# Patient Record
Sex: FEMALE | Race: WHITE | NOT HISPANIC OR LATINO | Employment: UNEMPLOYED | URBAN - METROPOLITAN AREA
[De-identification: names, ages, dates, MRNs, and addresses within clinical notes are randomized per-mention and may not be internally consistent; named-entity substitution may affect disease eponyms.]

---

## 2017-07-04 ENCOUNTER — HOSPITAL ENCOUNTER (EMERGENCY)
Facility: CLINIC | Age: 57
Discharge: HOME | End: 2017-07-04
Attending: EMERGENCY MEDICINE | Admitting: EMERGENCY MEDICINE
Payer: COMMERCIAL

## 2017-07-04 VITALS
SYSTOLIC BLOOD PRESSURE: 114 MMHG | OXYGEN SATURATION: 96 % | TEMPERATURE: 98.7 F | RESPIRATION RATE: 16 BRPM | HEART RATE: 68 BPM | DIASTOLIC BLOOD PRESSURE: 83 MMHG | WEIGHT: 170 LBS

## 2017-07-04 DIAGNOSIS — F10.20 ALCOHOL DEPENDENCE, CONTINUOUS DRINKING BEHAVIOR (H): ICD-10-CM

## 2017-07-04 DIAGNOSIS — F10.220 ALCOHOL DEPENDENCE WITH UNCOMPLICATED INTOXICATION (H): ICD-10-CM

## 2017-07-04 LAB — ALCOHOL BREATH TEST: 0.18 (ref 0–0.01)

## 2017-07-04 PROCEDURE — 99283 EMERGENCY DEPT VISIT LOW MDM: CPT | Performed by: EMERGENCY MEDICINE

## 2017-07-04 PROCEDURE — 82075 ASSAY OF BREATH ETHANOL: CPT | Performed by: EMERGENCY MEDICINE

## 2017-07-04 PROCEDURE — 99283 EMERGENCY DEPT VISIT LOW MDM: CPT | Mod: Z6 | Performed by: EMERGENCY MEDICINE

## 2017-07-04 RX ORDER — NICOTINE POLACRILEX 4 MG/1
20 GUM, CHEWING ORAL DAILY
COMMUNITY

## 2017-07-04 RX ORDER — CITALOPRAM HYDROBROMIDE 20 MG/1
30 TABLET ORAL DAILY
COMMUNITY
End: 2021-11-29

## 2017-07-04 RX ORDER — METOPROLOL TARTRATE 50 MG
50 TABLET ORAL 2 TIMES DAILY
COMMUNITY
End: 2021-09-30

## 2017-07-04 ASSESSMENT — ENCOUNTER SYMPTOMS
NECK STIFFNESS: 0
SEIZURES: 0
HALLUCINATIONS: 1
SHORTNESS OF BREATH: 0
ARTHRALGIAS: 0
HEADACHES: 0
CONFUSION: 0
EYE REDNESS: 0
ABDOMINAL PAIN: 0
COLOR CHANGE: 0
DIFFICULTY URINATING: 0
FEVER: 0

## 2017-07-04 NOTE — ED AVS SNAPSHOT
[facility], Emergency Department    [address]    [facility] [place] [address]    Phone:  [phone]    Fax:  [phone]                                       Enedina Davenport   MRN: [id number]    Department:  [facility], Emergency Department   Date of Visit:  7/4/2017           Patient Information     Date Of Birth          1960        Your diagnoses for this visit were:     Alcohol dependence, continuous drinking behavior (H)        You were seen by David Hunter MD.        Discharge Instructions         Alcohol Addiction  Are you addicted to alcohol?    You may be addicted to alcohol if your drinking harms yourself or others or leads to other problems with your daily life.  The medical term for this is alcohol use disorder. Your healthcare provider may diagnose you with this disorder if you have at least two of the following problems within the span of a year:    You drink alcohol in larger amounts or for a longer period than you intended.    You frequently want to cut down or control alcohol use, or have frequently failed efforts to do so.    You spend a lot of time getting alcohol, using alcohol, or recovering from alcohol use.    You crave or have a strong desire or urge to drink alcohol.    Ongoing alcohol use makes it hard for you to be responsible at work, school, or home.    You continue to use alcohol even though you have had problems in relationships or social settings because of it.    You give up or miss important social, work, or recreational activities because of alcohol use.    You drink alcohol in situations in which it is physically unsafe, such as drinking then driving while intoxicated.    You continue to drink alcohol despite knowing it has caused physical or emotional problems.    You need more and more alcohol to get the same effects.    You hide how much alcohol you drink from family and friends.    You have withdrawal symptoms or use alcohol to avoid withdrawal symptoms.  Date  "Last Reviewed: 2/1/2017 2000-2017 [employer]. [address] [place] [address], [place] [address]. All rights reserved. This information is not intended as a substitute for professional medical care. Always follow your healthcare professional's instructions.          Alcohol Abuse  Alcoholic drinks are harmful when you have too many of them. There is no set number of drinks that defines too much. Drinking that disrupts your life or your health is called alcohol abuse. Alcohol abuse can hurt your relationships with others. You may lose friends, a spouse, or even your job. You may be abusing alcohol if any of the following are true for you:    Duties at home or with  suffer because of drinking.    Duties at work or in school suffer because of drinking.    You have missed work or school because of drinking.    You use alcohol while driving or operating machinery.    You have legal problems such as arrests due to drinking.    You keep drinking even though it causes serious problems in your life.  Health effects  Alcohol abuse causes health problems. Sometimes this can happen after only drinking a  little.\" There is no set number of drinks or amount of alcohol that defines too much. The more you drink at one time, and the more often you drink determine both the short-term and long-term health effects. It affects all parts of your body and your health, including your:    Brain. Alcohol is a central nervous system depressant. It can damage parts of the brain that affect your balance, memory, thinking, and emotions. It can cause memory loss, blackouts, depression, agitation, sleep cycle changes, and seizures. These changes may or may not be reversible.    Heart and vascular system. Alcohol affects multiple areas. It can damage heart muscle causing cardiomyopathy, which is a weakening and stretching of the heart muscle. This can lead to trouble breathing, an irregular heartbeat, atrial fibrillation, leg " swelling, and heart failure. Alcohol use makes the blood vessels stiffen causing high blood pressure. All of these problems increase your risk of having heart attacks or strokes.    Liver. Alcohol causes fat to build up in the liver, affecting its normal function. This increases the risk for hepatitis, leading to abdominal pain, appetite loss, jaundice, bleeding problems, liver fibrosis, and cirrhosis. This, in turn, can affect your ability to fight off infections, and can cause diabetes. The liver changes prevent it from removing toxins in your blood that can cause encephalopathy, which may show with confusion, altered level of consciousness, personality changes, memory loss, seizures, coma, and death.    Pancreas. Alcohol can cause inflammation of the pancreas, or pancreatitis. This can cause abdominal pain, fever, and diabetes.    Immune system. Alcohol weakens your immune system in a number of ways. It suppresses your immune system making it harder to fight infections and colds. It also increases the chance of getting pneumonia and tuberculosis.    Cancer. Alcohol is a risk factor for developing cancer of the mouth, esophagus, pharynx, larynx, liver, and breast.    Sexual function. Alcohol can lead to sexual problems.  Home care  The following guidelines will help you deal with alcohol abuse:    Admit you have a problem with alcohol.    Ask for help from your healthcare provider and trusted family members or close friends.    Get help from people trained in dealing with alcohol abuse. This may be individual counseling or group therapy, or it may be a supervised alcohol treatment program.    Join a self-help group for alcohol abuse such as Alcoholics Anonymous (AA).    Avoid people who abuse alcohol or tempt you to drink.  Follow-up care  Follow up as advised by the healthcare provider, or as advised. Contact these groups to get help:    Alcoholics Anonymous (AA): Go to www.aa.org or check the phone book for  meetings near you.    National Alcohol and Substance Abuse Information Center (NASAIC): 145.447.2555 www.addictioncareoptions.[employer]    National [place] on Alcoholism and Drug Dependence (NCADD): 561-WOE-NFYR (274-5610) www.ncadd.org  Call 911  Call 911 if any of these occur:    Trouble breathing or slow irregular breathing    Chest pain    Sudden weakness on one side of your body or sudden trouble speaking    Heavy bleeding or vomiting blood    Very drowsy or trouble awakening    Fainting or loss of consciousness    Rapid heart rate    Seizure  When to seek medical care  Call your healthcare provider right away if any of these occur:     Confusion    Hallucinations (seeing, hearing, or feeling things that aren t there)    Pain in your upper abdomen that gets worse    Repeated vomiting or black or tarry stools    Severe shakiness  Date Last Reviewed: 6/1/2016 2000-2017 [employer]. [address] [place] [address]. All rights reserved. This information is not intended as a substitute for professional medical care. Always follow your healthcare professional's instructions.          Signs of Alcohol Addiction (Alcoholism)  Do you want to have more fun, to fit in, to cope better with your problems? It s as easy as taking a drink--if you believe what you see on television. But if you think that alcohol will improve your life, you re fooling yourself. The more you regularly rely on alcohol to relax you or get you  up,  the closer you move toward addiction. If you decide you are on the path to addiction, you can take action to change your behavior and find caring people to help you.  Check your addiction level  You may drink to feel more charming or carefree and relaxed. But in reality, alcohol can lead to impaired speech, poor judgment, and inappropriate or risky behavior. Alcohol can also lead to serious health problems. These include liver disease and heart disease. It can also cause loss of  mental function. To find out if you may have a problem with alcohol, read the following statements and answer. Answering  yes  to 3 or more questions may be a signal that alcohol is taking over your life:     Yes No      ? ? Do you think a party or social gathering isn t fun unless alcohol is served?   ? ? Have family members, friends, or coworkers ever commented on your drinking?   ? ? Do you have friends you drink with?   ? ? Do you look forward to your next drink?   ? ? If you only drink after work or on weekends, do you think you don t have a problem?   ? ? Are family members or friends beginning to avoid you?   ? ? Have you unsuccessfully tried to cut down or quit using alcohol?   ? ? Do you hide your use from other people?   ? ? Are you beginning to distrust and avoid some people?   ? ? Do you get up the day after drinking and not remember what happened the night before?   ? ? Do you have health problems as a result of your drinking?       Date Last Reviewed: 6/1/2016 2000-2017 [employer]. [address] [place] [address]. All rights reserved. This information is not intended as a substitute for professional medical care. Always follow your healthcare professional's instructions.          24 Hour Appointment Hotline       To make an appointment at any [facility], call [phone] ([phone]). If you don't have a family doctor or clinic, we will help you find one. [facility] clinics are conveniently located to serve the needs of you and your family.             Review of your medicines      Our records show that you are taking the medicines listed below. If these are incorrect, please call your family doctor or clinic.        Dose / Directions Last dose taken    celeXA 20 MG tablet   Dose:  20 mg   Generic drug:  citalopram        Take 20 mg by mouth daily   Refills:  0        metoprolol 50 MG tablet   Commonly known as:  LOPRESSOR   Dose:  50 mg        Take 50 mg by mouth  "2 times daily   Refills:  0        omeprazole 20 MG tablet   Dose:  20 mg        Take 20 mg by mouth daily   Refills:  0                Procedures and tests performed during your visit     Alcohol breath test POCT      Orders Needing Specimen Collection     Ordered          07/04/17 1924  Drug abuse screen 6 urine (tox) - STAT, [facility]: STAT, Needs to be Collected     Scheduled Task Status   07/04/17 1925 Collect Drug abuse screen 6 urine (tox) Open   Order Class:  PCU Collect                  Pending Results     No orders found from 7/2/2017 to 7/5/2017.            Pending Culture Results     No orders found from 7/2/2017 to 7/5/2017.            Pending Results Instructions     If you had any lab results that were not finalized at the time of your Discharge, you can call the ED Lab Result RN at [phone]. You will be contacted by this team for any positive Lab results or changes in treatment. The nurses are available 7 days a week from 10A to 6:30P.  You can leave a message 24 hours per day and they will return your call.        Thank you for choosing [facility]       Thank you for choosing [facility] for your care. Our goal is always to provide you with excellent care. Hearing back from our patients is one way we can continue to improve our services. Please take a few minutes to complete the written survey that you may receive in the mail after you visit with us. Thank you!        [employer] Information     [employer] lets you send messages to your doctor, view your test results, renew your prescriptions, schedule appointments and more. To sign up, go to [email or web address] . Click on \"Log in\" on the left side of the screen, which will take you to the Welcome page. Then click on \"Sign up Now\" on the right side of the page.     You will be asked to enter the access code listed below, as well as some personal information. Please follow the directions to create your username and password.     Your access code is: " [email or web address]  Expires: 10/2/2017  8:46 PM     Your access code will  in 90 days. If you need help or a new code, please call your [facility] clinic or [phone].        Care EveryWhere ID     This is your Care EveryWhere ID. This could be used by other organizations to access your [facility] medical records  [id number]        Equal Access to Services     [facility] : Hadii dann cruz Sosyeda, waaxda lumarveladaha, qaybta kaalmada patricia, anita mcgarry . So [facility] [phone].    ATENCIÓN: Si habla español, tiene a wall disposición servicios gratuitos de asistencia lingüística. Llame al [phone].    We comply with applicable federal civil rights laws and [place] laws. We do not discriminate on the basis of race, color, national origin, age, disability sex, sexual orientation or gender identity.            After Visit Summary       This is your record. Keep this with you and show to your community pharmacist(s) and doctor(s) at your next visit.

## 2017-07-04 NOTE — ED AVS SNAPSHOT
[facility], [place], Emergency Department    [address]    [facility] [address]    Phone:  [phone]    Fax:  [phone]                                       Enedina Davenport   MRN: [id number]    Department:  [facility], Emergency Department   Date of Visit:  7/4/2017           After Visit Summary Signature Page     I have received my discharge instructions, and my questions have been answered. I have discussed any challenges I see with this plan with the nurse or doctor.    ..........................................................................................................................................  Patient/Patient Representative Signature      ..........................................................................................................................................  Patient Representative Print Name and Relationship to Patient    ..................................................               ................................................  Date                                            Time    ..........................................................................................................................................  Reviewed by Signature/Title    ...................................................              ..............................................  Date                                                            Time

## 2017-07-05 NOTE — DISCHARGE INSTRUCTIONS
Alcohol Addiction  Are you addicted to alcohol?    You may be addicted to alcohol if your drinking harms yourself or others or leads to other problems with your daily life.  The medical term for this is alcohol use disorder. Your healthcare provider may diagnose you with this disorder if you have at least two of the following problems within the span of a year:    You drink alcohol in larger amounts or for a longer period than you intended.    You frequently want to cut down or control alcohol use, or have frequently failed efforts to do so.    You spend a lot of time getting alcohol, using alcohol, or recovering from alcohol use.    You crave or have a strong desire or urge to drink alcohol.    Ongoing alcohol use makes it hard for you to be responsible at work, school, or home.    You continue to use alcohol even though you have had problems in relationships or social settings because of it.    You give up or miss important social, work, or recreational activities because of alcohol use.    You drink alcohol in situations in which it is physically unsafe, such as drinking then driving while intoxicated.    You continue to drink alcohol despite knowing it has caused physical or emotional problems.    You need more and more alcohol to get the same effects.    You hide how much alcohol you drink from family and friends.    You have withdrawal symptoms or use alcohol to avoid withdrawal symptoms.  Date Last Reviewed: 2/1/2017 2000-2017 [employer]. [address] [place] [address]. All rights reserved. This information is not intended as a substitute for professional medical care. Always follow your healthcare professional's instructions.          Alcohol Abuse  Alcoholic drinks are harmful when you have too many of them. There is no set number of drinks that defines too much. Drinking that disrupts your life or your health is called alcohol abuse. Alcohol abuse can hurt your relationships  "with others. You may lose friends, a spouse, or even your job. You may be abusing alcohol if any of the following are true for you:    Duties at home or with  suffer because of drinking.    Duties at work or in school suffer because of drinking.    You have missed work or school because of drinking.    You use alcohol while driving or operating machinery.    You have legal problems such as arrests due to drinking.    You keep drinking even though it causes serious problems in your life.  Health effects  Alcohol abuse causes health problems. Sometimes this can happen after only drinking a  little.\" There is no set number of drinks or amount of alcohol that defines too much. The more you drink at one time, and the more often you drink determine both the short-term and long-term health effects. It affects all parts of your body and your health, including your:    Brain. Alcohol is a central nervous system depressant. It can damage parts of the brain that affect your balance, memory, thinking, and emotions. It can cause memory loss, blackouts, depression, agitation, sleep cycle changes, and seizures. These changes may or may not be reversible.    Heart and vascular system. Alcohol affects multiple areas. It can damage heart muscle causing cardiomyopathy, which is a weakening and stretching of the heart muscle. This can lead to trouble breathing, an irregular heartbeat, atrial fibrillation, leg swelling, and heart failure. Alcohol use makes the blood vessels stiffen causing high blood pressure. All of these problems increase your risk of having heart attacks or strokes.    Liver. Alcohol causes fat to build up in the liver, affecting its normal function. This increases the risk for hepatitis, leading to abdominal pain, appetite loss, jaundice, bleeding problems, liver fibrosis, and cirrhosis. This, in turn, can affect your ability to fight off infections, and can cause diabetes. The liver changes prevent it " from removing toxins in your blood that can cause encephalopathy, which may show with confusion, altered level of consciousness, personality changes, memory loss, seizures, coma, and death.    Pancreas. Alcohol can cause inflammation of the pancreas, or pancreatitis. This can cause abdominal pain, fever, and diabetes.    Immune system. Alcohol weakens your immune system in a number of ways. It suppresses your immune system making it harder to fight infections and colds. It also increases the chance of getting pneumonia and tuberculosis.    Cancer. Alcohol is a risk factor for developing cancer of the mouth, esophagus, pharynx, larynx, liver, and breast.    Sexual function. Alcohol can lead to sexual problems.  Home care  The following guidelines will help you deal with alcohol abuse:    Admit you have a problem with alcohol.    Ask for help from your healthcare provider and trusted family members or close friends.    Get help from people trained in dealing with alcohol abuse. This may be individual counseling or group therapy, or it may be a supervised alcohol treatment program.    Join a self-help group for alcohol abuse such as Alcoholics Anonymous (AA).    Avoid people who abuse alcohol or tempt you to drink.  Follow-up care  Follow up as advised by the healthcare provider, or as advised. Contact these groups to get help:    Alcoholics Anonymous (AA): Go to www.aa.org or check the phone book for meetings near you.    National Alcohol and Substance Abuse Information Center (NASAIC): 857.366.5366 www.addictioncareoptions.com    National [place] on Alcoholism and Drug Dependence (NCADD): 186-BPK-QXOT (493-8642) www.ncadd.org  Call 911  Call 911 if any of these occur:    Trouble breathing or slow irregular breathing    Chest pain    Sudden weakness on one side of your body or sudden trouble speaking    Heavy bleeding or vomiting blood    Very drowsy or trouble awakening    Fainting or loss of consciousness    Rapid  heart rate    Seizure  When to seek medical care  Call your healthcare provider right away if any of these occur:     Confusion    Hallucinations (seeing, hearing, or feeling things that aren t there)    Pain in your upper abdomen that gets worse    Repeated vomiting or black or tarry stools    Severe shakiness  Date Last Reviewed: 6/1/2016 2000-2017 [employer]. [address] [place] [address], [place] [address]. All rights reserved. This information is not intended as a substitute for professional medical care. Always follow your healthcare professional's instructions.          Signs of Alcohol Addiction (Alcoholism)  Do you want to have more fun, to fit in, to cope better with your problems? It s as easy as taking a drink--if you believe what you see on television. But if you think that alcohol will improve your life, you re fooling yourself. The more you regularly rely on alcohol to relax you or get you  up,  the closer you move toward addiction. If you decide you are on the path to addiction, you can take action to change your behavior and find caring people to help you.  Check your addiction level  You may drink to feel more charming or carefree and relaxed. But in reality, alcohol can lead to impaired speech, poor judgment, and inappropriate or risky behavior. Alcohol can also lead to serious health problems. These include liver disease and heart disease. It can also cause loss of mental function. To find out if you may have a problem with alcohol, read the following statements and answer. Answering  yes  to 3 or more questions may be a signal that alcohol is taking over your life:     Yes No      ? ? Do you think a party or social gathering isn t fun unless alcohol is served?   ? ? Have family members, friends, or coworkers ever commented on your drinking?   ? ? Do you have friends you drink with?   ? ? Do you look forward to your next drink?   ? ? If you only drink after work or on weekends, do you  think you don t have a problem?   ? ? Are family members or friends beginning to avoid you?   ? ? Have you unsuccessfully tried to cut down or quit using alcohol?   ? ? Do you hide your use from other people?   ? ? Are you beginning to distrust and avoid some people?   ? ? Do you get up the day after drinking and not remember what happened the night before?   ? ? Do you have health problems as a result of your drinking?       Date Last Reviewed: 6/1/2016 2000-2017 [employer]. [address] [place] [address]. All rights reserved. This information is not intended as a substitute for professional medical care. Always follow your healthcare professional's instructions.

## 2017-07-05 NOTE — ED PROVIDER NOTES
History     Chief Complaint   Patient presents with     Alcohol Intoxication     daily drinking , last 2 days heavy but overall only 5 days sober iun last 7 weeks     HPI  Enedina Davenport is a 57 year old female with a history of hypertension who presents today with an alcohol problem. She wants to go into detox and put herself back into treatment. Patient reports she has been sober since 2008; however, she started drinking daily about 6 months ago. She reports drinking anywhere from 6-18 oz of whiskey a day with her last drink around 5:30 PM today. Patient denies any drug use. Patient denies any withdrawal seizures but reports auditory hallucinations. She reports hearing voices which last happened a couple of weeks ago. She also reports severe depression and a feeling of helplessness, she is currently trying to get a psychiatrist. She denies any suicidal ideations. Of note, patient put herself into detox and treatment in 2008. Patient is also currently taking metoprolol.    I have reviewed the Medications, Allergies, Past Medical and Surgical History, and Social History in the Epic system.    History reviewed. No pertinent past medical history.    History reviewed. No pertinent surgical history.    No family history on file.    Social History   Substance Use Topics     Smoking status: Former Smoker     Smokeless tobacco: Never Used     Alcohol use Yes       No current facility-administered medications for this encounter.      Current Outpatient Prescriptions   Medication     metoprolol (LOPRESSOR) 50 MG tablet     citalopram (CELEXA) 20 MG tablet     omeprazole 20 MG tablet      No Known Allergies      Review of Systems   Constitutional: Negative for fever.   HENT: Negative for congestion.    Eyes: Negative for redness.   Respiratory: Negative for shortness of breath.    Cardiovascular: Negative for chest pain.   Gastrointestinal: Negative for abdominal pain.   Genitourinary: Negative for difficulty urinating.    Musculoskeletal: Negative for arthralgias and neck stiffness.   Skin: Negative for color change.   Neurological: Negative for seizures and headaches.   Psychiatric/Behavioral: Positive for hallucinations (auditory). Negative for confusion and suicidal ideas.   All other systems reviewed and are negative.      Physical Exam   BP: 116/76  Pulse: 91  Temp: 99  F (37.2  C)  Resp: 14  Weight: 77.1 kg (170 lb)  SpO2: 92 %  Physical Exam   Constitutional: No distress.   HENT:   Head: Atraumatic.   Mouth/Throat: Oropharynx is clear and moist. No oropharyngeal exudate.   Eyes: Pupils are equal, round, and reactive to light. No scleral icterus. Right eye exhibits nystagmus (bilateral lateral gaze nystagmus). Left eye exhibits nystagmus.   Cardiovascular: Normal heart sounds and intact distal pulses.    Pulmonary/Chest: Breath sounds normal. No respiratory distress.   Abdominal: Soft. Bowel sounds are normal. There is no tenderness.   Musculoskeletal: She exhibits no edema or tenderness.   Skin: Skin is warm. No rash noted. She is not diaphoretic.       ED Course   8:17 PM  The patient was seen and examined by Dr. Hunter in Room ED10.     ED Course     Procedures               Labs Ordered and Resulted from Time of ED Arrival Up to the Time of Departure from the ED   ALCOHOL BREATH TEST POCT - Abnormal; Notable for the following:        Result Value    Alcohol Breath Test 0.179 (*)     All other components within normal limits   DRUG ABUSE SCREEN 6 CHEM DEP URINE ([facility])            Assessments & Plan (with Medical Decision Making)   57-year-old female with history of alcohol or some presents requesting inpatient treatment.  She has been drinking 6 or more ounces of heavy alcohol per day.  Her exam revealed nystatin this but no other obvious stigmata of acute intoxication.  Alcohol breath test was elevated at 0.179.  We discussed treatment and the requirements that most treatment centers would request 24 hours free from alcohol.   This discussion led into detox Center admissions.  There were unfortunately no beds available at this institution and as the patient was with her family, she requested discharge to follow up.  She was provided with a number of detox options.    I have reviewed the nursing notes.    I have reviewed the findings, diagnosis, plan and need for follow up with the patient.    New Prescriptions    No medications on file       Final diagnoses:   Alcohol dependence, continuous drinking behavior (H)     IVincent, am serving as a trained medical scribe to document services personally performed by David Hunter MD, based on the provider's statements to me.   David FUNG MD, was physically present and have reviewed and verified the accuracy of this note documented by Vincent Barboza.    7/4/2017   [facility], [place], EMERGENCY DEPARTMENT     David Hunter MD  07/04/17 [id number]

## 2021-09-30 ENCOUNTER — OFFICE VISIT (OUTPATIENT)
Dept: FAMILY MEDICINE | Facility: CLINIC | Age: 61
End: 2021-09-30
Payer: COMMERCIAL

## 2021-09-30 VITALS
TEMPERATURE: 98.8 F | HEIGHT: 60 IN | OXYGEN SATURATION: 95 % | HEART RATE: 78 BPM | WEIGHT: 155.4 LBS | SYSTOLIC BLOOD PRESSURE: 116 MMHG | DIASTOLIC BLOOD PRESSURE: 77 MMHG | BODY MASS INDEX: 30.51 KG/M2

## 2021-09-30 DIAGNOSIS — Z00.00 ROUTINE HISTORY AND PHYSICAL EXAMINATION OF ADULT: Primary | ICD-10-CM

## 2021-09-30 LAB
ALBUMIN SERPL-MCNC: 3.8 G/DL (ref 3.4–5)
ALP SERPL-CCNC: 95 U/L (ref 40–150)
ALT SERPL W P-5'-P-CCNC: 26 U/L (ref 0–50)
ANION GAP SERPL CALCULATED.3IONS-SCNC: 7 MMOL/L (ref 3–14)
AST SERPL W P-5'-P-CCNC: 20 U/L (ref 0–45)
BASOPHILS # BLD AUTO: 0.1 10E3/UL (ref 0–0.2)
BASOPHILS NFR BLD AUTO: 1 %
BILIRUB SERPL-MCNC: 0.2 MG/DL (ref 0.2–1.3)
BUN SERPL-MCNC: 18 MG/DL (ref 7–30)
CALCIUM SERPL-MCNC: 9.4 MG/DL (ref 8.5–10.1)
CHLORIDE BLD-SCNC: 102 MMOL/L (ref 94–109)
CHOLEST SERPL-MCNC: 210 MG/DL
CO2 SERPL-SCNC: 27 MMOL/L (ref 20–32)
CREAT SERPL-MCNC: 0.85 MG/DL (ref 0.52–1.04)
EOSINOPHIL # BLD AUTO: 0.3 10E3/UL (ref 0–0.7)
EOSINOPHIL NFR BLD AUTO: 5 %
ERYTHROCYTE [DISTWIDTH] IN BLOOD BY AUTOMATED COUNT: 13.2 % (ref 10–15)
FASTING STATUS PATIENT QL REPORTED: ABNORMAL
GFR SERPL CREATININE-BSD FRML MDRD: 74 ML/MIN/1.73M2
GLUCOSE BLD-MCNC: 87 MG/DL (ref 70–99)
HCT VFR BLD AUTO: 36.3 % (ref 35–47)
HDLC SERPL-MCNC: 61 MG/DL
HGB BLD-MCNC: 12 G/DL (ref 11.7–15.7)
IMM GRANULOCYTES # BLD: 0 10E3/UL
IMM GRANULOCYTES NFR BLD: 0 %
LDLC SERPL CALC-MCNC: 125 MG/DL
LYMPHOCYTES # BLD AUTO: 1.9 10E3/UL (ref 0.8–5.3)
LYMPHOCYTES NFR BLD AUTO: 33 %
MCH RBC QN AUTO: 32.1 PG (ref 26.5–33)
MCHC RBC AUTO-ENTMCNC: 33.1 G/DL (ref 31.5–36.5)
MCV RBC AUTO: 97 FL (ref 78–100)
MONOCYTES # BLD AUTO: 0.6 10E3/UL (ref 0–1.3)
MONOCYTES NFR BLD AUTO: 10 %
NEUTROPHILS # BLD AUTO: 2.8 10E3/UL (ref 1.6–8.3)
NEUTROPHILS NFR BLD AUTO: 51 %
NONHDLC SERPL-MCNC: 149 MG/DL
NRBC # BLD AUTO: 0 10E3/UL
NRBC BLD AUTO-RTO: 0 /100
PLATELET # BLD AUTO: 242 10E3/UL (ref 150–450)
POTASSIUM BLD-SCNC: 4.4 MMOL/L (ref 3.4–5.3)
PROT SERPL-MCNC: 7.9 G/DL (ref 6.8–8.8)
RBC # BLD AUTO: 3.74 10E6/UL (ref 3.8–5.2)
SODIUM SERPL-SCNC: 136 MMOL/L (ref 133–144)
TRIGL SERPL-MCNC: 118 MG/DL
WBC # BLD AUTO: 5.6 10E3/UL (ref 4–11)

## 2021-09-30 PROCEDURE — 87340 HEPATITIS B SURFACE AG IA: CPT | Performed by: NURSE PRACTITIONER

## 2021-09-30 PROCEDURE — 86481 TB AG RESPONSE T-CELL SUSP: CPT | Performed by: NURSE PRACTITIONER

## 2021-09-30 PROCEDURE — 80061 LIPID PANEL: CPT | Performed by: NURSE PRACTITIONER

## 2021-09-30 PROCEDURE — 87389 HIV-1 AG W/HIV-1&-2 AB AG IA: CPT | Performed by: NURSE PRACTITIONER

## 2021-09-30 PROCEDURE — 86708 HEPATITIS A ANTIBODY: CPT | Performed by: NURSE PRACTITIONER

## 2021-09-30 PROCEDURE — 85025 COMPLETE CBC W/AUTO DIFF WBC: CPT | Performed by: NURSE PRACTITIONER

## 2021-09-30 PROCEDURE — 80053 COMPREHEN METABOLIC PANEL: CPT | Performed by: NURSE PRACTITIONER

## 2021-09-30 PROCEDURE — 86704 HEP B CORE ANTIBODY TOTAL: CPT | Performed by: NURSE PRACTITIONER

## 2021-09-30 PROCEDURE — 86803 HEPATITIS C AB TEST: CPT | Performed by: NURSE PRACTITIONER

## 2021-09-30 RX ORDER — FAMOTIDINE 20 MG/1
20 TABLET, FILM COATED ORAL 2 TIMES DAILY PRN
COMMUNITY

## 2021-09-30 RX ORDER — BUSPIRONE HYDROCHLORIDE 10 MG/1
10 TABLET ORAL 2 TIMES DAILY PRN
COMMUNITY

## 2021-09-30 RX ORDER — SENNOSIDES 8.6 MG
650 CAPSULE ORAL EVERY 8 HOURS PRN
COMMUNITY

## 2021-09-30 RX ORDER — METOPROLOL SUCCINATE 50 MG/1
50 TABLET, EXTENDED RELEASE ORAL DAILY
COMMUNITY

## 2021-09-30 RX ORDER — QUETIAPINE FUMARATE 100 MG/1
100 TABLET, FILM COATED ORAL AT BEDTIME
COMMUNITY

## 2021-09-30 ASSESSMENT — ANXIETY QUESTIONNAIRES
3. WORRYING TOO MUCH ABOUT DIFFERENT THINGS: NOT AT ALL
5. BEING SO RESTLESS THAT IT IS HARD TO SIT STILL: NOT AT ALL
6. BECOMING EASILY ANNOYED OR IRRITABLE: NOT AT ALL
GAD7 TOTAL SCORE: 0
7. FEELING AFRAID AS IF SOMETHING AWFUL MIGHT HAPPEN: NOT AT ALL
2. NOT BEING ABLE TO STOP OR CONTROL WORRYING: NOT AT ALL
IF YOU CHECKED OFF ANY PROBLEMS ON THIS QUESTIONNAIRE, HOW DIFFICULT HAVE THESE PROBLEMS MADE IT FOR YOU TO DO YOUR WORK, TAKE CARE OF THINGS AT HOME, OR GET ALONG WITH OTHER PEOPLE: NOT DIFFICULT AT ALL
1. FEELING NERVOUS, ANXIOUS, OR ON EDGE: NOT AT ALL

## 2021-09-30 ASSESSMENT — PATIENT HEALTH QUESTIONNAIRE - PHQ9
5. POOR APPETITE OR OVEREATING: NOT AT ALL
SUM OF ALL RESPONSES TO PHQ QUESTIONS 1-9: 6

## 2021-09-30 ASSESSMENT — MIFFLIN-ST. JEOR: SCORE: 1183.45

## 2021-09-30 NOTE — PROGRESS NOTES
Progress Note    SUBJECTIVE:  Enedina Davenport is an 61 year old, , who requests an Annual Preventive Exam. She is from [facility], she entered their restorative program on .  Her drug of choice is alcohol.      Concerns today include: she states she has some right nipple discharge.      Menstrual History:  Menstrual History 2017   LAST MENSTRUAL PERIOD 2017       Last  No results found for: PAP  History of abnormal Pap smear: NO - age 30-65 PAP every 5 years with negative HPV co-testing recommended    Last No results found for: HPV16  Last No results found for: HPV18  Last No results found for: HRHPV    Mammogram current: yes  Last Mammogram:   No results found.     Last Colonoscopy:  No results found for this or any previous visit.      HISTORY:  acetaminophen (TYLENOL 8 HOUR ARTHRITIS PAIN) 650 MG CR tablet, Take 650 mg by mouth every 8 hours as needed for mild pain or fever  B Complex Vitamins (B COMPLEX 1 PO),   busPIRone (BUSPAR) 10 MG tablet, Take 10 mg by mouth 2 times daily as needed  citalopram (CELEXA) 20 MG tablet, Take 30 mg by mouth daily   [employer] TRIPLE MAGNESIUM COMPLEX PO,   famotidine (PEPCID) 20 MG tablet, Take 20 mg by mouth 2 times daily as needed  melatonin 5 MG tablet, Take 5 mg by mouth nightly as needed for sleep  metoprolol succinate ER (TOPROL-XL) 50 MG 24 hr tablet, Take 50 mg by mouth daily  QUEtiapine (SEROQUEL) 100 MG tablet, Take 100 mg by mouth At Bedtime  omeprazole 20 MG tablet, Take 20 mg by mouth daily    No current facility-administered medications on file prior to visit.    Allergies   Allergen Reactions     Codeine Nausea and Vomiting     Reports all narcotics     Immunization History   Administered Date(s) Administered     COVID-19,PF,Moderna 2021, 2021     FLU 6-35 months 10/03/2011, 10/15/2013     Influenza (IIV3) PF 2005, 2006, 10/30/2007, 10/20/2010, 10/09/2012     Influenza Vaccine IM > 6 months Valent IIV4 (Alfuria,Fluzone)  2015, 2016, 2018, 10/16/2018, 11/15/2019, 2020     Meningococcal (Menveo ) 2019     Pneumococcal 23 valent 2006     TD (ADULT, 7+) 2017     Td (Adult), Adsorbed 2004, 2017     Tdap (Adacel,Boostrix) 10/04/2007     Zoster vaccine recombinant adjuvanted (SHINGRIX) 2020, 2021       OB History    Para Term  AB Living   1 1 1 0 0 1   SAB TAB Ectopic Multiple Live Births   0 0 0 0 0     Past Medical History:   Diagnosis Date     Alcohol abuse      Anxiety      Cancer (H)      Cerebellar atrophy (H)      Depressive disorder      Gastroesophageal reflux disease      Hypertension      Insomnia      Psoriasis      Past Surgical History:   Procedure Laterality Date     HYSTERECTOMY       ORBITAL FLOOR EXPLORATION       TONSILLECTOMY & ADENOIDECTOMY       Family History   Problem Relation Age of Onset     Hypertension Father      Heart Surgery Father      Neurologic Disorder Father      Hyperlipidemia Sister      Substance Abuse Mother      Cerebrovascular Disease Maternal Grandmother      Coronary Artery Disease Maternal Grandfather      Rheumatic fever Paternal Grandmother      Kidney failure Paternal Grandfather      Cancer No family hx of      Diabetes No family hx of      Social History     Socioeconomic History     Marital status:      Spouse name: None     Number of children: None     Years of education: None     Highest education level: None   Occupational History     None   Tobacco Use     Smoking status: Passive Smoke Exposure - Never Smoker     Smokeless tobacco: Never Used   Vaping Use     Vaping Use: Never used   Substance and Sexual Activity     Alcohol use: Not Currently     Comment: in treatment     Drug use: No     Sexual activity: Not Currently     Partners: Male   Other Topics Concern     None   Social History Narrative     None     Social Determinants of Health     Financial Resource Strain:      Difficulty of Paying  "Living Expenses:    Food Insecurity:      Worried About Running Out of Food in the Last Year:      Ran Out of Food in the Last Year:    Transportation Needs:      Lack of Transportation (Medical):      Lack of Transportation (Non-Medical):    Physical Activity:      Days of Exercise per Week:      Minutes of Exercise per Session:    Stress:      Feeling of Stress :    Social Connections:      Frequency of Communication with Friends and Family:      Frequency of Social Gatherings with Friends and Family:      Attends Rastafari Services:      Active Member of Clubs or Organizations:      Attends Club or Organization Meetings:      Marital Status:    Intimate Partner Violence:      Fear of Current or Ex-Partner:      Emotionally Abused:      Physically Abused:      Sexually Abused:        ROS  Review Of Systems  Skin: eczema lower extremities  Eyes: negative, she had a vision exam about 2 years ago, she does not wear glasses or contacts  Ears/Nose/Throat: negative- dental exam 4-5 years ago  Respiratory: No shortness of breath, dyspnea on exertion, cough, or hemoptysis  Cardiovascular: negative  Gastrointestinal: negative  Genitourinary: negative, Pap within a year at [facility]  Musculoskeletal: as above  Neurologic: negative  Psychiatric: negative  Hematologic/Lymphatic/Immunologic: negative  Endocrine: negative    PHQ-9 SCORE 9/30/2021   PHQ-9 Total Score 6     MITCH-7 SCORE 9/30/2021   Total Score 0         EXAM:  Blood pressure 116/77, pulse 78, temperature 98.8  F (37.1  C), temperature source Oral, height 1.511 m (4' 11.5\"), weight 70.5 kg (155 lb 6.4 oz), SpO2 95 %, not currently breastfeeding. Body mass index is 30.86 kg/m .  General - pleasant female in no acute distress.  Skin -left lower leg, anterior/shin excoriated, red lesions, approximately 6-7 cm diameter, right malleolus excoriated 3 cm lesion  EENT-  PERRLA, euthyroid with out palpable nodules. Right ear cerumen impacted.  Neck - supple without " lymphadenopathy.  Lungs - clear to auscultation bilaterally.  Heart - regular rate and rhythm without murmur.  Abdomen - soft, nontender, nondistended, no masses or organomegaly noted.  Musculoskeletal - no gross deformities.  Neurological - normal strength, sensation, and mental status.    Breast Exam:  Breast: Without visible skin changes. No dimpling or lesions seen.   Breasts supple, non-tender with palpation, no dominant mass, nodularity. Axillary nodes negative.  Right nipple scabbed, no nipple discharge noted on exam.      ASSESSMENT:  Encounter Diagnosis   Name Primary?     Routine history and physical examination of adult Yes        PLAN:   Orders Placed This Encounter   Procedures     Screening Mammogram Digital Bilateral     Comprehensive metabolic panel     Lipid panel reflex to direct LDL Fasting     Hepatitis B core antibody     Hepatitis B surface antigen     Hepatitis Antibody A IgG     Hepatitis C Screen Reflex to HCV RNA Quant and Genotype     HIV Antigen Antibody Combo     CBC with platelets and differential     Orders Placed This Encounter   Medications     B Complex Vitamins (B COMPLEX 1 PO)     melatonin 5 MG tablet     Sig: Take 5 mg by mouth nightly as needed for sleep     metoprolol succinate ER (TOPROL-XL) 50 MG 24 hr tablet     Sig: Take 50 mg by mouth daily     QUEtiapine (SEROQUEL) 100 MG tablet     Sig: Take 100 mg by mouth At Bedtime     [employer]S TRIPLE MAGNESIUM COMPLEX PO     busPIRone (BUSPAR) 10 MG tablet     Sig: Take 10 mg by mouth 2 times daily as needed     famotidine (PEPCID) 20 MG tablet     Sig: Take 20 mg by mouth 2 times daily as needed     acetaminophen (TYLENOL 8 HOUR ARTHRITIS PAIN) 650 MG CR tablet     Sig: Take 650 mg by mouth every 8 hours as needed for mild pain or fever       Additional teaching done at this visit regarding exercise, smoking cessation, mental health and weight/diet.    Return to clinic in one year.  Follow-up as needed.

## 2021-09-30 NOTE — NURSING NOTE
"ROOM:2    Preferred Name: Enedina     61 year old  Chief Complaint   Patient presents with     Physical       Blood pressure 116/77, pulse 78, temperature 98.8  F (37.1  C), temperature source Oral, height 1.511 m (4' 11.5\"), weight 70.5 kg (155 lb 6.4 oz), SpO2 95 %, not currently breastfeeding. Body mass index is 30.86 kg/m .      There is no problem list on file for this patient.      Wt Readings from Last 2 Encounters:   09/30/21 70.5 kg (155 lb 6.4 oz)   07/04/17 77.1 kg (170 lb)     BP Readings from Last 3 Encounters:   09/30/21 116/77   07/04/17 114/83       Allergies   Allergen Reactions     Codeine Nausea and Vomiting     Reports all narcotics       Current Outpatient Medications   Medication     citalopram (CELEXA) 20 MG tablet     metoprolol (LOPRESSOR) 50 MG tablet     omeprazole 20 MG tablet     No current facility-administered medications for this visit.       Social History     Tobacco Use     Smoking status: Passive Smoke Exposure - Never Smoker     Smokeless tobacco: Never Used   Vaping Use     Vaping Use: Never used   Substance Use Topics     Alcohol use: Not Currently     Drug use: No       Honoring Choices - Health Care Directive Guide offered to patient at time of visit.    There are no preventive care reminders to display for this patient.      There is no immunization history on file for this patient.    No results found for: PAP      No lab results found.    No flowsheet data found.    PHQ-9 SCORE 9/30/2021   PHQ-9 Total Score 6       MITCH-7 SCORE 9/30/2021   Total Score 0       No flowsheet data found.      Brooke Frye CMA  September 30, 2021 1:35 PM    "

## 2021-09-30 NOTE — LETTER
October 5, 2021      Enedina Davenport  [address] [facility] [address]        Dear ,     We are writing to inform you of your test results.    Test results indicate you may require additional follow up, see comment below.    Please take a look at diet and exercise for this cholesterol- lower fats and increased exercise.  Let me know if any questions.  We should repeat in 6 months.  Thanks!    Resulted Orders   Lipid panel reflex to direct LDL Fasting   Result Value Ref Range    Cholesterol 210 (H) <200 mg/dL    Triglycerides 118 <150 mg/dL    Direct Measure HDL 61 >=50 mg/dL    LDL Cholesterol Calculated 125 (H) <=100 mg/dL    Non HDL Cholesterol 149 (H) <130 mg/dL    Patient Fasting > 8hrs? Unknown     Narrative    Cholesterol  Desirable:  <200 mg/dL    Triglycerides  Normal:  Less than 150 mg/dL  Borderline High:  150-199 mg/dL  High:  200-499 mg/dL  Very High:  Greater than or equal to 500 mg/dL    Direct Measure HDL  Female:  Greater than or equal to 50 mg/dL   Male:  Greater than or equal to 40 mg/dL    LDL Cholesterol  Desirable:  <100mg/dL  Above Desirable:  100-129 mg/dL   Borderline High:  130-159 mg/dL   High:  160-189 mg/dL   Very High:  >= 190 mg/dL    Non HDL Cholesterol  Desirable:  130 mg/dL  Above Desirable:  130-159 mg/dL  Borderline High:  160-189 mg/dL  High:  190-219 mg/dL  Very High:  Greater than or equal to 220 mg/dL   CBC with platelets and differential   Result Value Ref Range    WBC Count 5.6 4.0 - 11.0 10e3/uL    RBC Count 3.74 (L) 3.80 - 5.20 10e6/uL    Hemoglobin 12.0 11.7 - 15.7 g/dL    Hematocrit 36.3 35.0 - 47.0 %    MCV 97 78 - 100 fL    MCH 32.1 26.5 - 33.0 pg    MCHC 33.1 31.5 - 36.5 g/dL    RDW 13.2 10.0 - 15.0 %    Platelet Count 242 150 - 450 10e3/uL    % Neutrophils 51 %    % Lymphocytes 33 %    % Monocytes 10 %    % Eosinophils 5 %    % Basophils 1 %    % Immature Granulocytes 0 %    NRBCs per 100 WBC 0 <1 /100    Absolute Neutrophils 2.8 1.6 - 8.3 10e3/uL     Absolute Lymphocytes 1.9 0.8 - 5.3 10e3/uL    Absolute Monocytes 0.6 0.0 - 1.3 10e3/uL    Absolute Eosinophils 0.3 0.0 - 0.7 10e3/uL    Absolute Basophils 0.1 0.0 - 0.2 10e3/uL    Absolute Immature Granulocytes 0.0 <=0.0 10e3/uL    Absolute NRBCs 0.0 10e3/uL       If you have any questions or concerns, please call the clinic at the number listed above.       Sincerely,      Julieta Joaquin NP

## 2021-10-01 LAB
HAV IGG SER QL IA: NONREACTIVE
HBV CORE AB SERPL QL IA: NONREACTIVE
HBV SURFACE AG SERPL QL IA: NONREACTIVE
HCV AB SERPL QL IA: NONREACTIVE
HIV 1+2 AB+HIV1 P24 AG SERPL QL IA: NONREACTIVE

## 2021-10-01 ASSESSMENT — ANXIETY QUESTIONNAIRES: GAD7 TOTAL SCORE: 0

## 2021-10-03 LAB
GAMMA INTERFERON BACKGROUND BLD IA-ACNC: 0.08 IU/ML
M TB IFN-G BLD-IMP: NEGATIVE
M TB IFN-G CD4+ BCKGRND COR BLD-ACNC: 9.92 IU/ML
MITOGEN IGNF BCKGRD COR BLD-ACNC: 0.11 IU/ML
MITOGEN IGNF BCKGRD COR BLD-ACNC: 0.14 IU/ML
QUANTIFERON MITOGEN: 10 IU/ML
QUANTIFERON NIL TUBE: 0.08 IU/ML
QUANTIFERON TB1 TUBE: 0.19 IU/ML
QUANTIFERON TB2 TUBE: 0.22

## 2021-10-14 ENCOUNTER — OFFICE VISIT (OUTPATIENT)
Dept: FAMILY MEDICINE | Facility: CLINIC | Age: 61
End: 2021-10-14
Payer: COMMERCIAL

## 2021-10-14 VITALS
BODY MASS INDEX: 30.35 KG/M2 | OXYGEN SATURATION: 97 % | WEIGHT: 154.6 LBS | SYSTOLIC BLOOD PRESSURE: 114 MMHG | HEIGHT: 60 IN | RESPIRATION RATE: 16 BRPM | DIASTOLIC BLOOD PRESSURE: 77 MMHG | HEART RATE: 65 BPM | TEMPERATURE: 98.1 F

## 2021-10-14 DIAGNOSIS — S62.101D CLOSED FRACTURE OF RIGHT WRIST WITH ROUTINE HEALING, SUBSEQUENT ENCOUNTER: Primary | ICD-10-CM

## 2021-10-14 DIAGNOSIS — L30.9 ECZEMA, UNSPECIFIED TYPE: ICD-10-CM

## 2021-10-14 RX ORDER — ESCITALOPRAM OXALATE 10 MG/1
20 TABLET ORAL DAILY
COMMUNITY

## 2021-10-14 ASSESSMENT — PAIN SCALES - GENERAL: PAINLEVEL: MODERATE PAIN (4)

## 2021-10-14 ASSESSMENT — MIFFLIN-ST. JEOR: SCORE: 1194.11

## 2021-10-14 NOTE — NURSING NOTE
"ROOM:3    Preferred Name: Enedina     61 year old  Chief Complaint   Patient presents with     RECHECK     Follow up right wrist fracture, 3 months ago.       Flu Shot       Blood pressure 114/77, pulse 65, temperature 98.1  F (36.7  C), temperature source Oral, resp. rate 16, height 1.534 m (5' 0.4\"), weight 70.1 kg (154 lb 9.6 oz), SpO2 97 %, not currently breastfeeding. Body mass index is 29.79 kg/m .  BP completed using cuff size:    There is no problem list on file for this patient.      Wt Readings from Last 2 Encounters:   10/14/21 70.1 kg (154 lb 9.6 oz)   09/30/21 70.5 kg (155 lb 6.4 oz)     BP Readings from Last 3 Encounters:   10/14/21 114/77   09/30/21 116/77   07/04/17 114/83       Allergies   Allergen Reactions     Codeine Nausea and Vomiting     Reports all narcotics       Current Outpatient Medications   Medication     acetaminophen (TYLENOL 8 HOUR ARTHRITIS PAIN) 650 MG CR tablet     B Complex Vitamins (B COMPLEX 1 PO)     busPIRone (BUSPAR) 10 MG tablet     CVS TRIPLE MAGNESIUM COMPLEX PO     escitalopram (LEXAPRO) 10 MG tablet     famotidine (PEPCID) 20 MG tablet     melatonin 5 MG tablet     metoprolol succinate ER (TOPROL-XL) 50 MG 24 hr tablet     omeprazole 20 MG tablet     QUEtiapine (SEROQUEL) 100 MG tablet     citalopram (CELEXA) 20 MG tablet     No current facility-administered medications for this visit.       Social History     Tobacco Use     Smoking status: Passive Smoke Exposure - Never Smoker     Smokeless tobacco: Never Used   Vaping Use     Vaping Use: Never used   Substance Use Topics     Alcohol use: Not Currently     Comment: in treatment     Drug use: No       Honoring Choices - Health Care Directive Guide offered to patient at time of visit.    Health Maintenance Due   Topic Date Due     PREVENTIVE CARE VISIT  Never done     ADVANCE CARE PLANNING  Never done     MAMMO SCREENING  Never done     COLORECTAL CANCER SCREENING  Never done     PAP  Never done     INFLUENZA VACCINE (1) " 09/01/2021       Immunization History   Administered Date(s) Administered     COVID-19,PF,Moderna 04/23/2021, 05/19/2021     FLU 6-35 months 10/03/2011, 10/15/2013     Influenza (IIV3) PF 11/07/2005, 11/01/2006, 10/30/2007, 10/20/2010, 10/09/2012     Influenza Vaccine IM > 6 months Valent IIV4 (Alfuria,Fluzone) 12/09/2015, 09/28/2016, 02/06/2018, 10/16/2018, 11/15/2019, 12/09/2020     Meningococcal (Menveo ) 07/23/2019     Pneumococcal 23 valent 09/01/2006     TD (ADULT, 7+) 01/17/2017     Td (Adult), Adsorbed 09/29/2004, 01/17/2017     Tdap (Adacel,Boostrix) 10/04/2007     Zoster vaccine recombinant adjuvanted (SHINGRIX) 12/09/2020, 02/17/2021       No results found for: PAP    Recent Labs   Lab Test 09/30/21  1508   *   HDL 61   TRIG 118   ALT 26   CR 0.85   GFRESTIMATED 74   ALBUMIN 3.8   POTASSIUM 4.4       No flowsheet data found.    PHQ-9 SCORE 9/30/2021   PHQ-9 Total Score 6       MITCH-7 SCORE 9/30/2021   Total Score 0       No flowsheet data found.    Evelin Parisi RN    October 14, 2021 3:07 PM

## 2021-10-14 NOTE — PROGRESS NOTES
Chief Complaint   Patient presents with     RECHECK     Follow up right wrist fracture, 3 months ago.       Flu Shot           HPI: Enedina is a 61 year old female following up today regarding a right wrist fracture, 3 months ago, care given for her fracture in other health system.   She also is inquiring about a continuing skin issue she has had for years, described as eczema, and would like a flu shot.    She reports she did not attend her orthopedic follow up appointment post fracture, so inquiring about follow up x-ray. She has not had any pain recently associated with her wrist until this AM. She notes it to be a 3-4/10 on the pain severity scale. Unsure if the pain is associated with weather change. Denies numbness or tingling. She wore her splint until today. Would just like a follow-up x-ray to assess, no further concerns.    Patient stated her eczema has been increasingly itchy. She has had this for years. States it is no worse than previously. Typically uses a creme to help resolve and it has worked in the past, which she would like prescribed.     ROS:  Skin: eczema type rash on right outer shin and left ankle, itchy  Musculoskeletal: previous right wrist fracture    SH: The Patient's  reports that she is a non-smoker but has been exposed to tobacco smoke. She has never used smokeless tobacco. She reports previous alcohol use. She reports that she does not use drugs.      FH: The Patient's family history includes Cerebrovascular Disease in her maternal grandmother; Coronary Artery Disease in her maternal grandfather; Heart Surgery in her father; Hyperlipidemia in her sister; Hypertension in her father; Kidney failure in her paternal grandfather; Neurologic Disorder in her father; Rheumatic fever in her paternal grandmother; Substance Abuse in her mother.       Meds:    Current Outpatient Medications   Medication     acetaminophen (TYLENOL 8 HOUR ARTHRITIS PAIN) 650 MG CR tablet     B Complex Vitamins (B  "COMPLEX 1 PO)     busPIRone (BUSPAR) 10 MG tablet     CVS TRIPLE MAGNESIUM COMPLEX PO     escitalopram (LEXAPRO) 10 MG tablet     famotidine (PEPCID) 20 MG tablet     melatonin 5 MG tablet     metoprolol succinate ER (TOPROL-XL) 50 MG 24 hr tablet     omeprazole 20 MG tablet     QUEtiapine (SEROQUEL) 100 MG tablet     citalopram (CELEXA) 20 MG tablet     No current facility-administered medications for this visit.       O:    /77 (BP Location: Left arm, Patient Position: Sitting, Cuff Size: Adult Regular)   Pulse 65   Temp 98.1  F (36.7  C) (Oral)   Resp 16   Ht 1.534 m (5' 0.4\")   Wt 70.1 kg (154 lb 9.6 oz)   LMP  (LMP Unknown)   SpO2 97%   BMI 29.79 kg/m        Physical Examination:     General appearance - alert, well appearing, and in no distress    Mental status - alert, oriented to person, place, and time    Musculoskeletal - limited mobility, stiffness, in right wrist- flexion, extension limited.      Skin - eczema bilateral lower extremities, right outer shin and left ankle. Open, bright red, excoriated, large patches    A/P:           ICD-10-CM    1. Closed fracture of right wrist with routine healing, subsequent encounter  S62.101D XR Wrist Right 2 Views   2. Eczema, unspecified type  L30.9 Adult Dermatology Referral       1. Closed fracture of right wrist with routine healing, subsequent encounter  Right wrist x-ray ordered. Will order PT based on results.  She will use brace prn until xray     2. Eczema  Dermatology consult ordered to determine appropriate treatment.       Luma Gomez, [employer] student    I, uJlieta Joaquin, DNP, APRN, FNP, ANP, reviewed and verified the nurse practitioner (NP)  student's documentation of the patient's history.  I performed the exam and medical decision making activities with the NP student, who was present for learning purposes.      "

## 2021-10-20 ENCOUNTER — ANCILLARY PROCEDURE (OUTPATIENT)
Dept: GENERAL RADIOLOGY | Facility: CLINIC | Age: 61
End: 2021-10-20
Attending: NURSE PRACTITIONER
Payer: COMMERCIAL

## 2021-10-20 DIAGNOSIS — S62.101D CLOSED FRACTURE OF RIGHT WRIST WITH ROUTINE HEALING, SUBSEQUENT ENCOUNTER: ICD-10-CM

## 2021-10-20 PROCEDURE — 73100 X-RAY EXAM OF WRIST: CPT | Mod: RT | Performed by: RADIOLOGY

## 2021-11-29 ENCOUNTER — OFFICE VISIT (OUTPATIENT)
Dept: FAMILY MEDICINE | Facility: CLINIC | Age: 61
End: 2021-11-29
Payer: COMMERCIAL

## 2021-11-29 VITALS
BODY MASS INDEX: 30.78 KG/M2 | OXYGEN SATURATION: 96 % | DIASTOLIC BLOOD PRESSURE: 78 MMHG | WEIGHT: 156.8 LBS | HEART RATE: 71 BPM | TEMPERATURE: 99.3 F | HEIGHT: 60 IN | SYSTOLIC BLOOD PRESSURE: 114 MMHG

## 2021-11-29 DIAGNOSIS — N64.52 BREAST DISCHARGE: ICD-10-CM

## 2021-11-29 DIAGNOSIS — M25.531 RIGHT WRIST PAIN: Primary | ICD-10-CM

## 2021-11-29 DIAGNOSIS — Z00.00 ROUTINE HISTORY AND PHYSICAL EXAMINATION OF ADULT: ICD-10-CM

## 2021-11-29 ASSESSMENT — MIFFLIN-ST. JEOR: SCORE: 1200.92

## 2021-11-29 NOTE — NURSING NOTE
"ROOM:1    Preferred Name: Enedina     61 year old  Chief Complaint   Patient presents with     Musculoskeletal Problem     F/U     Referral     PT, clarify mammo       Blood pressure 114/78, pulse 71, temperature 99.3  F (37.4  C), temperature source Oral, height 1.529 m (5' 0.2\"), weight 71.1 kg (156 lb 12.8 oz), SpO2 96 %, not currently breastfeeding. Body mass index is 30.42 kg/m .      There is no problem list on file for this patient.      Wt Readings from Last 2 Encounters:   11/29/21 71.1 kg (156 lb 12.8 oz)   10/14/21 70.1 kg (154 lb 9.6 oz)     BP Readings from Last 3 Encounters:   11/29/21 114/78   10/14/21 114/77   09/30/21 116/77       Allergies   Allergen Reactions     Codeine Nausea and Vomiting     Reports all narcotics       Current Outpatient Medications   Medication     acetaminophen (TYLENOL 8 HOUR ARTHRITIS PAIN) 650 MG CR tablet     B Complex Vitamins (B COMPLEX 1 PO)     busPIRone (BUSPAR) 10 MG tablet     escitalopram (LEXAPRO) 10 MG tablet     famotidine (PEPCID) 20 MG tablet     melatonin 5 MG tablet     metoprolol succinate ER (TOPROL-XL) 50 MG 24 hr tablet     omeprazole 20 MG tablet     QUEtiapine (SEROQUEL) 100 MG tablet     citalopram (CELEXA) 20 MG tablet     [employer] TRIPLE MAGNESIUM COMPLEX PO     No current facility-administered medications for this visit.       Social History     Tobacco Use     Smoking status: Passive Smoke Exposure - Never Smoker     Smokeless tobacco: Never Used   Vaping Use     Vaping Use: Never used   Substance Use Topics     Alcohol use: Not Currently     Comment: in treatment     Drug use: No       Honoring Choices - Health Care Directive Guide offered to patient at time of visit.    Health Maintenance Due   Topic Date Due     ADVANCE CARE PLANNING  Never done     MAMMO SCREENING  Never done     COLORECTAL CANCER SCREENING  Never done     PAP  Never done     COVID-19 Vaccine (3 - Booster for Moderna series) 11/19/2021       Immunization History   Administered " Date(s) Administered     COVID-19,PF,Moderna 04/23/2021, 05/19/2021     FLU 6-35 months 10/03/2011, 10/15/2013     Influenza (IIV3) PF 11/07/2005, 11/01/2006, 10/30/2007, 10/20/2010, 10/09/2012     Influenza Vaccine IM > 6 months Valent IIV4 (Alfuria,Fluzone) 12/09/2015, 09/28/2016, 02/06/2018, 10/16/2018, 11/15/2019, 12/09/2020, 10/14/2021     Meningococcal (Menveo ) 07/23/2019     Pneumococcal 23 valent 09/01/2006     TD (ADULT, 7+) 01/17/2017     Td (Adult), Adsorbed 09/29/2004, 01/17/2017     Tdap (Adacel,Boostrix) 10/04/2007     Zoster vaccine recombinant adjuvanted (SHINGRIX) 12/09/2020, 02/17/2021       No results found for: PAP      Recent Labs   Lab Test 09/30/21  1508   *   HDL 61   TRIG 118   ALT 26   CR 0.85   GFRESTIMATED 74   ALBUMIN 3.8   POTASSIUM 4.4       PHQ-2 ( 1999 Pfizer) 11/29/2021   Q1: Little interest or pleasure in doing things 0   Q2: Feeling down, depressed or hopeless 0   PHQ-2 Score 0       PHQ-9 SCORE 9/30/2021   PHQ-9 Total Score 6       MITCH-7 SCORE 9/30/2021   Total Score 0       No flowsheet data found.      Brooke Frye, DRE  November 29, 2021 2:28 PM

## 2021-11-29 NOTE — PROGRESS NOTES
"Patient: Enedina Davenport YOB: 1960    Date of Exam: November/29/2021    Arrival Time: 02 16 PM  Departure Time: 04 00 PM    Please be advised that this client resides in a facility in which narcotic medications are not permitted. If pain management is needed, please prescribe an alternative medication.     Enedina Davenport is a 61 year old who presents for the following    Patient presents with:  Musculoskeletal Problem: F/U  Referral: PT, clarify mammo    Enedina had a right wrist fracture about 4 months ago that was treated before she was known to us.  We sent her for an xray follow up that showed good healing.  She states that it has been stiff and the ROM is limited by stiffness and pain.  We had discussed physical therapy and she is interested in that now.      Enedina has noted nipple discharge.  We did a breast exam at her last visit, it was negative.      Do you need any refills on your Medications today? No    Review Of Systems   ROS: 10 point ROS neg other than the symptoms noted above in the HPI.    General Physical Exam:  Vitals: /78   Pulse 71   Temp 99.3  F (37.4  C) (Oral)   Ht 1.529 m (5' 0.2\")   Wt 71.1 kg (156 lb 12.8 oz)   LMP  (LMP Unknown)   SpO2 96%   BMI 30.42 kg/m    Musculoskeletal:   no lower extremity pitting edema present  there is no redness, warmth, or swelling of the joints  motor strength is 5 out of 5 all extremities bilaterally  tone is normal  with exception of  RIGHT WRIST:  range of motion limited/stiff  flexion  extension         Additional Comments:N/A    Assessment / Plan:  (M25.531) Right wrist pain  (primary encounter diagnosis)    Plan: Physical Therapy Referral      (Z00.00) Routine history and physical examination of adult      (N64.52) Breast discharge    Plan: Diagnostic Mammogram Digital Bilateral        Referrals Made:   Referral to diagnostic mammogram  If a referral was made to a [facility] and you don't get a call " from central scheduling please call [phone].  If a Mammogram was ordered for you at [facility] call [phone] to schedule or change your appointment.  If you had an XRay/CT/Ultrasound/MRI ordered the number is [phone] to schedule or change your radiology appointment.     Follow up as needed.    Medication changes made at today's visit: MEDICATIONS:        - Continue other medications without change    Julieta Joaquin NP

## 2021-12-22 ENCOUNTER — THERAPY VISIT (OUTPATIENT)
Dept: OCCUPATIONAL THERAPY | Facility: CLINIC | Age: 61
End: 2021-12-22
Payer: COMMERCIAL

## 2021-12-22 DIAGNOSIS — M25.531 RIGHT WRIST PAIN: ICD-10-CM

## 2021-12-22 PROCEDURE — 97535 SELF CARE MNGMENT TRAINING: CPT | Mod: GO | Performed by: OCCUPATIONAL THERAPIST

## 2021-12-22 PROCEDURE — 97110 THERAPEUTIC EXERCISES: CPT | Mod: GO | Performed by: OCCUPATIONAL THERAPIST

## 2021-12-22 PROCEDURE — 97165 OT EVAL LOW COMPLEX 30 MIN: CPT | Mod: GO | Performed by: OCCUPATIONAL THERAPIST

## 2021-12-22 NOTE — PROGRESS NOTES
Hand Therapy Initial Evaluation    Current Date:  12/22/2021    Diagnosis: R wrist fracture, R wrist pain   (ulnar sided - appears to have some irritability of the FCU and TFCC area)  DOI: June 2021; orders 11/29/21  Procedure:  Was in splint after Fx    Precautions: none    Subjective:  Enedina Davenport is a 61 year old female.    Pt reports pain of the ulnar side of the wrist, it will throb, wake her up.    Activities affected:  12/22/2021 Pt reports the following is difficult for them to do: opening doors, carrying book bag    Patient reports symptoms of the right wrist which occurred due to fall. Since onset symptoms are still bothersome  General health as reported by patient is good.  Pertinent medical history includes:HTN on meds, h/o cancer - still has f/ups but not tx  Medical allergies:none.  Surgical history: orthopedic: eye.  Medication history: Does take tylenol.    Currently in program/staying at [facility], until March 2022  Daily Tasks: ADL, IADL    Occupational Profile Information:  Right hand dominant  Prior functional level:  no limitations  Patient reports symptoms of pain and weakness/loss of strength  Special tests:  x-ray.    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    Functional Outcome Measure:   Upper Extremity Functional Index Score:  SCORE:   Column Totals: /80: 64   (A lower score indicates greater disability.)    Objective:  Pain Level (Scale 0-10)   12/22/2021   At Rest sometimes none, today 3   With Use 8 at times when it wakes her up     Pain Description  Date 12/22/2021   Location  ulnar wrist   Pain Quality Aching, Burning, Dull, Gnawing, Nagging, Sharp, Stabbing and Throbbing   Frequency intermittent     Pain is worst  nighttime   Exacerbated by  overuse   Relieved by rest   Progression  about the same     Sensation    WNL  throughout all nerve distributions; per patient report    Edema: mild of the R ulnar wrist    ROM  Pain Report: - none  + mild    ++ moderate    +++ severe     Wrist 12/22/2021 12/22/2021   AROM (PROM)  R  L   Extension 60 52   Flexion 45 + 52   RD 22 18   UD 40 (1/10) 48   Supination 70 (2/10) 72   Pronation 80 85       Palpation  Pain Report:  -none + mild    ++ moderate    +++ severe   R 12/22/2021   Radial Styloid    DRSN    TFCC 2/10   FCU +   ECU -   DRUJ - and no laxity         Strength:  Pain-free /Pinch Test    12/22/2021   Trials R L   1   23 sore on release 44     Lat Pinch  12/22/2021   Trials R L   1   7 10     3 Pt Pinch  12/22/2021   Trials R L   1           Assessment:  Patient presents with symptoms consistent with diagnosis of right wrist distal radius fracture, with conservative intervention.  Currently presenting with ulnar-sided right wrist pain mainly.  She has some irritability of the FCU, as well as some general irritability around the TFCC area and also the ulnar distal forearm.    Patient's limitations or Problem List includes:  Pain and Weakness of the right wrist which interferes with the patient's ability to perform Self Care Tasks , Recreational Activities and Household Chores as compared to previous level of function.    Rehab Potential:  Good - Return to full activity, some limitations    Patient will benefit from skilled Occupational Therapy to increase ROM, strength and decrease pain to return to previous activity level and resume normal daily tasks and to reach their rehab potential.    Barriers to Learning:  No barrier    Communication Issues:  Patient appears to be able to clearly communicate and understand verbal and written communication and follow directions correctly.    Chart Review: Brief history including review of medical and/or therapy records relating to the presenting problem and Simple history review with patient    Identified Performance Deficits: hygiene and  grooming, home establishment and management and meal preparation and cleanup    Assessment of Occupational Performance:  3-5 Performance Deficits  2 X a month, once daily  Clinical Decision Making (Complexity): Low complexity    Treatment Explanation:  The following has been discussed with the patient:  RX ordered/plan of care  Anticipated outcomes  Possible risks and side effects    Plan:  Frequency:    Duration:  for 2 months    Treatment Plan:    Modalities:    Fluidotherapy and Paraffin   Therapeutic Exercise:    AROM, PROM, Isotonics, Isometrics and Stabilization  Therapeutic Activities:   Functional activities   Neuromuscular re-ed:   Coordination/Dexterity, Proprioceptive Training and Kinesiotaping  Manual Techniques:   Joint mobilization  Orthotic Fabrication:    Static and Forearm based  Self Care:    Self Care Tasks and Ergonomic Considerations    Discharge Plan:    Achieve all LTG.  Independent in home treatment program.  Reach maximal therapeutic benefit.    Home Program:   Stretches, joint protection    Next Visit:  Check ROM, strength, ulnar wrist pain  Possible orthosis  Possible soft OTC brace  KT?  STM?

## 2021-12-27 ENCOUNTER — ANCILLARY PROCEDURE (OUTPATIENT)
Dept: MAMMOGRAPHY | Facility: CLINIC | Age: 61
End: 2021-12-27
Attending: NURSE PRACTITIONER
Payer: COMMERCIAL

## 2021-12-27 DIAGNOSIS — N64.52 BREAST DISCHARGE: ICD-10-CM

## 2021-12-27 PROCEDURE — G0279 TOMOSYNTHESIS, MAMMO: HCPCS | Performed by: RADIOLOGY

## 2021-12-27 PROCEDURE — 77066 DX MAMMO INCL CAD BI: CPT | Performed by: RADIOLOGY

## 2021-12-27 PROCEDURE — 76642 ULTRASOUND BREAST LIMITED: CPT | Mod: RT | Performed by: RADIOLOGY

## 2022-01-05 ENCOUNTER — THERAPY VISIT (OUTPATIENT)
Dept: OCCUPATIONAL THERAPY | Facility: CLINIC | Age: 62
End: 2022-01-05
Payer: COMMERCIAL

## 2022-01-05 DIAGNOSIS — M25.531 RIGHT WRIST PAIN: Primary | ICD-10-CM

## 2022-01-05 PROCEDURE — 97110 THERAPEUTIC EXERCISES: CPT | Mod: GO | Performed by: OCCUPATIONAL THERAPIST

## 2022-01-05 NOTE — PROGRESS NOTES
Discharge Note - Hand Therapy    Current Date:  1/5/2022    Diagnosis: R wrist fracture, R wrist pain   (ulnar sided - appears to have some irritability of the FCU and TFCC area)  DOI: June 2021; orders 11/29/21  Procedure:  Was in splint after Fx    Precautions: none    Subjective:   Learning about keeping the wrist in neutral made a huge difference.  It seems to be getting better and not waking me up at night. It will ache but not throb so bad like it did.    Objective:  Pain Level (Scale 0-10)   12/22/2021 1/5/2022     At Rest sometimes none, today 3 0, 2 on average day   With Use 8 at times when it wakes her up Still throbbing but less. At worst a 3.     Pain Description  Date 12/22/2021   Location  ulnar wrist   Pain Quality Aching, Burning, Dull, Gnawing, Nagging, Sharp, Stabbing and Throbbing   Frequency intermittent     Pain is worst  nighttime   Exacerbated by  overuse   Relieved by rest   Progression  about the same     Sensation    WNL throughout all nerve distributions; per patient report    Edema: mild of the R ulnar wrist    ROM  Pain Report: - none  + mild    ++ moderate    +++ severe     Wrist 12/22/2021 12/22/2021 1/5/2022     AROM (PROM)  R  L R   Extension 60 52    Flexion 45 + 52 ~50, 52 (possible carpal boss)  PROM: 60   RD 22 18    UD 40 (1/10) 48    Supination 70 (2/10) 72    Pronation 80 85        Palpation  Pain Report:  -none + mild    ++ moderate    +++ severe   R 12/22/2021 1/5/2022     Radial Styloid     DRSN     TFCC 2/10    FCU + -   ECU - -   DRUJ - and no laxity -, no laxity          Strength:  Pain-free /Pinch Test    12/22/2021 1/5/2022     Trials R L R   1   23 sore on release 44 24 not as sore as last time  31 (hurt before, then recovers)     Lat Pinch  12/22/2021   Trials R L   1   7 10     Assessment:  Response to therapy has been improvement to:  ROM of Wrist:  All Planes  Strength:  .  Improvement in ADL and IADL.  Appropriateness of Rx I have re-evaluated this  patient and find that the nature, scope, duration and intensity of the therapy is appropriate for the medical condition of the patient.  Overall Assessment:  Patient is progressing well.  Patient is ready to be discharged from therapy and continue their home treatment program.  STG/LTG:  See goal sheet for details and updates.    Plan:  Frequency/Duration:  Discharge from Hand Therapy; continue home program.

## 2023-03-17 ENCOUNTER — HOSPITAL ENCOUNTER (EMERGENCY)
Facility: CLINIC | Age: 63
Discharge: HOME | End: 2023-03-17
Attending: EMERGENCY MEDICINE | Admitting: EMERGENCY MEDICINE
Payer: COMMERCIAL

## 2023-03-17 VITALS
RESPIRATION RATE: 16 BRPM | HEIGHT: 61 IN | DIASTOLIC BLOOD PRESSURE: 75 MMHG | OXYGEN SATURATION: 96 % | TEMPERATURE: 98.2 F | WEIGHT: 150 LBS | HEART RATE: 94 BPM | SYSTOLIC BLOOD PRESSURE: 114 MMHG | BODY MASS INDEX: 28.32 KG/M2

## 2023-03-17 DIAGNOSIS — F10.920 ALCOHOLIC INTOXICATION WITHOUT COMPLICATION (H): ICD-10-CM

## 2023-03-17 LAB
ALCOHOL BREATH TEST: 0.21 (ref 0–0.01)
SARS-COV-2 RNA RESP QL NAA+PROBE: NEGATIVE

## 2023-03-17 PROCEDURE — 82075 ASSAY OF BREATH ETHANOL: CPT | Performed by: EMERGENCY MEDICINE

## 2023-03-17 PROCEDURE — 99283 EMERGENCY DEPT VISIT LOW MDM: CPT | Performed by: EMERGENCY MEDICINE

## 2023-03-17 PROCEDURE — 99284 EMERGENCY DEPT VISIT MOD MDM: CPT | Performed by: EMERGENCY MEDICINE

## 2023-03-17 PROCEDURE — U0005 INFEC AGEN DETEC AMPLI PROBE: HCPCS | Performed by: EMERGENCY MEDICINE

## 2023-03-17 PROCEDURE — C9803 HOPD COVID-19 SPEC COLLECT: HCPCS | Performed by: EMERGENCY MEDICINE

## 2023-03-17 RX ORDER — FLUOXETINE HYDROCHLORIDE 60 MG/1
60 TABLET, FILM COATED ORAL; ORAL
COMMUNITY

## 2023-03-17 ASSESSMENT — ACTIVITIES OF DAILY LIVING (ADL): ADLS_ACUITY_SCORE: 35

## 2023-03-18 NOTE — ED PROVIDER NOTES
ED Provider Note  [facility]      History     Chief Complaint   Patient presents with     Alcohol Problem     Seeking detox     HPI  Enedina Davenport is a 63 year old female who has a longstanding history of alcohol dependence and abuse arriving today to the emergency department with recent relapse.  This patient has been sober for approximate 6 months staying at a sober house.  Reportedly yesterday she began drinking again.  She was seen at [facility] with complaints of foot pain and chest pain.  She had negative medical work-up and was dismissed back to the sober house.  The patient to me reports she has not been drinking anymore but was kicked out of a sober house as she was felt to be intoxicated.  She reports she is unsure if she had anything else to drink but drank heavily yesterday.  She denies active medical problems at this time including headache, chest pain, abdominal pain, nausea, vomiting, diarrhea.  She reports no medical concern at this time.    Past Medical History  Past Medical History:   Diagnosis Date     Alcohol abuse      Anxiety      Cancer (H)      Cerebellar atrophy (H)     this affects her balance     Cerebellar atrophy (H)      Depressive disorder      Gastroesophageal reflux disease      Hypertension      Insomnia      Psoriasis      Right wrist fracture      Past Surgical History:   Procedure Laterality Date     HYSTERECTOMY       ORBITAL FLOOR EXPLORATION       TONSILLECTOMY & ADENOIDECTOMY       busPIRone (BUSPAR) 10 MG tablet  FLUoxetine HCl 60 MG TABS  melatonin 5 MG CAPS  metoprolol succinate ER (TOPROL-XL) 50 MG 24 hr tablet  QUEtiapine (SEROQUEL) 100 MG tablet  acetaminophen (TYLENOL 8 HOUR ARTHRITIS PAIN) 650 MG CR tablet  B Complex Vitamins (B COMPLEX 1 PO)  escitalopram (LEXAPRO) 10 MG tablet  famotidine (PEPCID) 20 MG tablet  melatonin 5 MG tablet  omeprazole 20 MG tablet      Allergies   Allergen Reactions     Codeine Nausea and Vomiting  "    Reports all narcotics     Family History  Family History   Problem Relation Age of Onset     Hypertension Father      Heart Surgery Father      Neurologic Disorder Father      Hyperlipidemia Sister      Substance Abuse Mother      Cerebrovascular Disease Maternal Grandmother      Coronary Artery Disease Maternal Grandfather      Rheumatic fever Paternal Grandmother      Kidney failure Paternal Grandfather      Cancer No family hx of      Diabetes No family hx of      Social History   Social History     Tobacco Use     Smoking status: Never     Passive exposure: Yes     Smokeless tobacco: Never   Vaping Use     Vaping Use: Never used   Substance Use Topics     Alcohol use: Yes     Comment: last drink last night, sober 6 months prior to drinking     Drug use: No      Past medical history, past surgical history, medications, allergies, family history, and social history were reviewed with the patient. No additional pertinent items.      A medically appropriate review of systems was performed with pertinent positives and negatives noted in the HPI, and all other systems negative.    Physical Exam   BP: 114/76  Pulse: 93  Temp: 98.3  F (36.8  C)  Resp: 16  Height: 154.9 cm (5' 1\")  Weight: 68 kg (150 lb)  SpO2: 95 %  Physical Exam  Vitals and nursing note reviewed.   Constitutional:       General: She is not in acute distress.     Appearance: She is not ill-appearing.   HENT:      Head: Normocephalic and atraumatic.   Cardiovascular:      Rate and Rhythm: Normal rate.   Pulmonary:      Effort: Pulmonary effort is normal. No respiratory distress.   Musculoskeletal:         General: Normal range of motion.   Skin:     General: Skin is warm and dry.      Capillary Refill: Capillary refill takes less than 2 seconds.      Findings: No bruising or erythema.   Neurological:      General: No focal deficit present.      Mental Status: She is alert and oriented to person, place, and time.      Cranial Nerves: No cranial nerve " deficit.   Psychiatric:         Mood and Affect: Mood normal.         Behavior: Behavior normal.         Thought Content: Thought content normal.         ED Course, Procedures, & Data      Procedures                Results for orders placed or performed during the hospital encounter of 03/17/23   Asymptomatic COVID-19 Virus (Coronavirus) by PCR Nose     Status: Normal    Specimen: Nose; Swab   Result Value Ref Range    SARS CoV2 PCR Negative Negative    Narrative    Testing was performed using the Xpert Xpress SARS-CoV-2 Assay on the Cepheid Gene-Xpert Instrument Systems. Additional information about this Emergency Use Authorization (EUA) assay can be found via the Lab Guide. This test should be ordered for the detection of SARS-CoV-2 in individuals who meet SARS-CoV-2 clinical and/or epidemiological criteria as well as from individuals without symptoms or other reasons to suspect COVID-19. Test performance for asymptomatic patients has only been established in anterior nasal swab specimens. This test is for in vitro diagnostic use under the FDA EUA for laboratories certified under CLIA to perform high complexity testing. This test has not been FDA cleared or approved. A negative result does not rule out the presence of PCR inhibitors in the specimen or target RNA concentration below the limit of detection for the assay. The possibility of a false negative should be considered if the patient's recent exposure or clinical presentation suggests COVID-19. This test was validated by the [facility]. This laboratory is certified under the Clinical Laboratory Improvement Amendments (CLIA) as qualified to perform high complexity laboratory testing.     Alcohol breath test POCT     Status: Abnormal   Result Value Ref Range    Alcohol Breath Test 0.206 (A) 0.00 - 0.01     Medications - No data to display  Labs Ordered and Resulted from Time of ED Arrival to Time of ED Departure   ALCOHOL  BREATH TEST POCT - Abnormal       Result Value    Alcohol Breath Test 0.206 (*)    COVID-19 VIRUS (CORONAVIRUS) BY PCR - Normal    SARS CoV2 PCR Negative       No orders to display              Assessment & Plan      Enedina Davenport is a 63 year old female who has a longstanding history of alcohol dependence and abuse arriving today to the emergency department with recent relapse.  On arrival patient noted alert.  Presently afebrile and he medically stable.  She is seated upright in bed and appears to be nontoxic.  From medical standpoint externally she has no sign of trauma warranting imaging at this time.  No sign of systemic illness.  I did review records with no longstanding medical issues contributing to current symptoms.  She does appear to have mild slurred speech consistent with suspected alcohol intoxication.  Breathalyzer 0.2.  I did call our detox.  They will not take the patient as she has only been drinking 2 days.  I would agree that low risk for major withdrawal.  I did offer to call [address] [place] [address].  She does not want to go there.  I did offer to help get a hold of family members.  She does not want any family involved.  I did call her detox facility and talk with her manager multiple times.  She is not allowed to return for 3 days secondary to alcohol on board.  Patient would prefer to go to a hotel.  She is currently ambulatory and tolerating fluids.  We are certainly happy to reevaluate this patient should she have any change or worsening of symptoms.    I have reviewed the nursing notes. I have reviewed the findings, diagnosis, plan and need for follow up with the patient.    New Prescriptions    No medications on file       Final diagnoses:   Alcoholic intoxication without complication (H)       Domingo Hassan  [facility] EMERGENCY DEPARTMENT  3/17/2023     Domingo Hassan MD  03/17/23 0943

## 2023-03-18 NOTE — ED TRIAGE NOTES
Seeking detox.  Lives in sober house.  Rosaline mckinnon number [phone].   Per Rosaline pt is not allowed back to sober house until goes to detox for 3 days.  Name of sober house is [facility].  Last drink last night.  States  Has been sober for 6 months prior to last night.  Denies seizures.     Triage Assessment     Row Name 03/17/23 1920       Triage Assessment (Adult)    Airway WDL WDL       Respiratory WDL    Respiratory WDL WDL       Skin Circulation/Temperature WDL    Skin Circulation/Temperature WDL WDL       Cardiac WDL    Cardiac WDL WDL       Peripheral/Neurovascular WDL    Peripheral Neurovascular WDL WDL       Cognitive/Neuro/Behavioral WDL    Cognitive/Neuro/Behavioral WDL WDL

## 2023-03-18 NOTE — ED NOTES
Pt states that she has set up a hotel room and is setting up a cab for transportation. Patient speaking clearly and ambulating independently without issue.